# Patient Record
Sex: MALE | Race: WHITE | Employment: FULL TIME | URBAN - METROPOLITAN AREA
[De-identification: names, ages, dates, MRNs, and addresses within clinical notes are randomized per-mention and may not be internally consistent; named-entity substitution may affect disease eponyms.]

---

## 2024-09-27 ENCOUNTER — OFFICE VISIT (OUTPATIENT)
Age: 24
End: 2024-09-27

## 2024-09-27 VITALS
HEART RATE: 86 BPM | HEIGHT: 74 IN | WEIGHT: 231 LBS | TEMPERATURE: 98.1 F | RESPIRATION RATE: 16 BRPM | OXYGEN SATURATION: 98 % | SYSTOLIC BLOOD PRESSURE: 118 MMHG | BODY MASS INDEX: 29.65 KG/M2 | DIASTOLIC BLOOD PRESSURE: 79 MMHG

## 2024-09-27 DIAGNOSIS — L08.9 INFECTION OF GREAT TOE: Primary | ICD-10-CM

## 2024-09-27 RX ORDER — CEPHALEXIN 500 MG/1
500 CAPSULE ORAL 4 TIMES DAILY
Qty: 40 CAPSULE | Refills: 0 | Status: SHIPPED | OUTPATIENT
Start: 2024-09-27 | End: 2024-09-28 | Stop reason: SDUPTHER

## 2024-09-27 RX ORDER — MUPIROCIN 20 MG/G
OINTMENT TOPICAL
Qty: 1 G | Refills: 0 | Status: SHIPPED | OUTPATIENT
Start: 2024-09-27 | End: 2024-09-28 | Stop reason: SDUPTHER

## 2024-09-27 NOTE — PROGRESS NOTES
Left lower leg: Edema present.   Skin:     General: Skin is warm.      Findings: Erythema present.      Comments: Left great toe oozing pus, nail bed lifting, erythema, edema   Neurological:      Mental Status: He is alert.           An electronic signature was used to authenticate this note.       Becka Chapa, NICKY - CNP

## 2024-09-27 NOTE — PATIENT INSTRUCTIONS
Please use topical antibiotic as ordered.  Please take oral antibiotic as ordered.  Please follow up with podiatry, referral given.  Go to nearest emergency room if symptoms worsen including development of a fever.

## 2024-09-28 ENCOUNTER — TELEPHONE (OUTPATIENT)
Age: 24
End: 2024-09-28

## 2024-09-28 DIAGNOSIS — L08.9 INFECTION OF GREAT TOE: ICD-10-CM

## 2024-09-28 RX ORDER — CEPHALEXIN 500 MG/1
500 CAPSULE ORAL 4 TIMES DAILY
Qty: 40 CAPSULE | Refills: 0 | Status: SHIPPED | OUTPATIENT
Start: 2024-09-28 | End: 2024-10-08

## 2024-09-28 RX ORDER — MUPIROCIN 20 MG/G
OINTMENT TOPICAL
Qty: 1 G | Refills: 0 | Status: SHIPPED | OUTPATIENT
Start: 2024-09-28 | End: 2024-10-05

## 2024-09-28 NOTE — TELEPHONE ENCOUNTER
Pt called regarding medication not being sent. After looking in chart it looks like it was not sent. Updated correct pharmacy in chart

## 2024-09-30 LAB
BACTERIA SPEC CULT: ABNORMAL
GRAM STN SPEC: ABNORMAL
SERVICE CMNT-IMP: ABNORMAL

## 2024-09-30 RX ORDER — DOXYCYCLINE HYCLATE 100 MG
100 TABLET ORAL 2 TIMES DAILY
Qty: 20 TABLET | Refills: 0 | Status: SHIPPED | OUTPATIENT
Start: 2024-09-30 | End: 2024-10-10

## 2024-09-30 RX ORDER — CIPROFLOXACIN 500 MG/1
500 TABLET, FILM COATED ORAL 2 TIMES DAILY
Qty: 20 TABLET | Refills: 0 | Status: SHIPPED | OUTPATIENT
Start: 2024-09-30 | End: 2024-10-10